# Patient Record
Sex: FEMALE
[De-identification: names, ages, dates, MRNs, and addresses within clinical notes are randomized per-mention and may not be internally consistent; named-entity substitution may affect disease eponyms.]

---

## 2021-04-06 PROBLEM — Z00.00 ENCOUNTER FOR PREVENTIVE HEALTH EXAMINATION: Status: ACTIVE | Noted: 2021-04-06

## 2021-04-06 RX ORDER — RIVASTIGMINE 4.6 MG/24H
4.6 PATCH, EXTENDED RELEASE TRANSDERMAL DAILY
Qty: 90 | Refills: 1 | Status: ACTIVE | COMMUNITY
Start: 2021-04-06 | End: 1900-01-01

## 2021-06-09 ENCOUNTER — APPOINTMENT (OUTPATIENT)
Dept: NEUROLOGY | Facility: CLINIC | Age: 86
End: 2021-06-09
Payer: MEDICARE

## 2021-06-09 VITALS
SYSTOLIC BLOOD PRESSURE: 127 MMHG | WEIGHT: 140 LBS | HEIGHT: 64 IN | BODY MASS INDEX: 23.9 KG/M2 | DIASTOLIC BLOOD PRESSURE: 84 MMHG | HEART RATE: 88 BPM

## 2021-06-09 DIAGNOSIS — I10 ESSENTIAL (PRIMARY) HYPERTENSION: ICD-10-CM

## 2021-06-09 DIAGNOSIS — Z82.49 FAMILY HISTORY OF ISCHEMIC HEART DISEASE AND OTHER DISEASES OF THE CIRCULATORY SYSTEM: ICD-10-CM

## 2021-06-09 DIAGNOSIS — Z80.1 FAMILY HISTORY OF MALIGNANT NEOPLASM OF TRACHEA, BRONCHUS AND LUNG: ICD-10-CM

## 2021-06-09 DIAGNOSIS — E03.9 HYPOTHYROIDISM, UNSPECIFIED: ICD-10-CM

## 2021-06-09 DIAGNOSIS — I48.0 PAROXYSMAL ATRIAL FIBRILLATION: ICD-10-CM

## 2021-06-09 DIAGNOSIS — M06.9 RHEUMATOID ARTHRITIS, UNSPECIFIED: ICD-10-CM

## 2021-06-09 PROCEDURE — 99213 OFFICE O/P EST LOW 20 MIN: CPT

## 2021-06-09 RX ORDER — LEVOTHYROXINE SODIUM 0.07 MG/1
75 TABLET ORAL
Refills: 0 | Status: ACTIVE | COMMUNITY

## 2021-06-09 RX ORDER — DIGOXIN 125 UG/1
125 TABLET ORAL
Refills: 0 | Status: ACTIVE | COMMUNITY

## 2021-06-09 RX ORDER — APIXABAN 2.5 MG/1
2.5 TABLET, FILM COATED ORAL
Refills: 0 | Status: ACTIVE | COMMUNITY

## 2021-06-09 RX ORDER — METOPROLOL SUCCINATE 100 MG/1
100 TABLET, EXTENDED RELEASE ORAL
Refills: 0 | Status: ACTIVE | COMMUNITY

## 2021-06-09 RX ORDER — ERGOCALCIFEROL 1.25 MG/1
1.25 MG CAPSULE ORAL
Refills: 0 | Status: ACTIVE | COMMUNITY

## 2021-06-09 RX ORDER — RIVASTIGMINE 9.5 MG/24H
9.5 PATCH, EXTENDED RELEASE TRANSDERMAL
Qty: 90 | Refills: 1 | Status: ACTIVE | COMMUNITY
Start: 2021-06-09 | End: 1900-01-01

## 2021-06-09 NOTE — ASSESSMENT
[FreeTextEntry1] : Mrs. Daigle is an 86-year-old with subacute dementia of the Alzheimer's type.  Her son wishes to increase her rivastigmine dose to 9.5 mg daily.  Alternate day dosing is only possible to lack of supervision.  She will need to be monitored for side effects.  He mentioned that she at times experiences melancholia.  If this persists, addition of a serotonin reuptake inhibitor can be considered.  I also discussed the new [employer] for Alzheimer's disease.  He would be interested if she is eligible.  She will return to the office in 4 months for routine follow-up.  Telephone contact will be maintained in the meantime.

## 2021-06-09 NOTE — REVIEW OF SYSTEMS
[Confused or Disoriented] : confusion [Memory Lapses or Loss] : memory loss [Decr. Concentrating Ability] : decreased concentrating ability [Difficulty Walking] : difficulty walking

## 2021-06-09 NOTE — HISTORY OF PRESENT ILLNESS
[FreeTextEntry1] : Mrs. Emi Daigle returned to the office having been last seen on September 1, 2020 by telemedicine. She is an 86-year-old right-handed patient with Amyvid PET scan proven Alzheimer’s dementia. She was intolerant to donepezil experiencing weight loss.  She has been treated with a rivastigmine patch 4.6 mg every other day without side effects.\par \par She was accompanied to the office by her son Parker.  She lives in her own home.  She is supervised quite frequently.  She is no longer able to self administer medications.  She has not wandered from her home.  She is able to dress, bathe and toilet herself.\par \par She was recently hospitalized at [facility] after suffering a fall.\par \par PAST MEDICAL/SURGICAL HISTORY: She suffers from hyperlipidemia, paroxysmal atrial fibrillation,\par rheumatoid arthritis and hypothyroidism. 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 There is no history of hypertension, diabetes,\par coronary, renal, hepatic, gastrointestinal or hematologic disease. Notable for bilateral oophorectomy.\par \par MEDICATIONS: Include rivastigmine patch 4.6 mg every other day, levothyroxine, Eliquis, digoxin, and Toprol.\par \par ALLERGIES: No Known Allergies\par \par FAMILY HISTORY: Notable for lung cancer, heart and thyroid disease.\par \par SOCIAL HISTORY: Alcohol - SOCIAL DRINKER; Occupation - RETIRED - .; Relationships -\par ; Smoking - FORMER SMOKER

## 2022-05-10 ENCOUNTER — APPOINTMENT (OUTPATIENT)
Dept: NEUROLOGY | Facility: CLINIC | Age: 87
End: 2022-05-10
Payer: MEDICARE

## 2022-05-10 VITALS
DIASTOLIC BLOOD PRESSURE: 79 MMHG | OXYGEN SATURATION: 98 % | SYSTOLIC BLOOD PRESSURE: 127 MMHG | BODY MASS INDEX: 23.05 KG/M2 | HEIGHT: 64 IN | WEIGHT: 135 LBS | HEART RATE: 89 BPM

## 2022-05-10 DIAGNOSIS — F02.80 ALZHEIMER'S DISEASE, UNSPECIFIED: ICD-10-CM

## 2022-05-10 DIAGNOSIS — G30.9 ALZHEIMER'S DISEASE, UNSPECIFIED: ICD-10-CM

## 2022-05-10 PROCEDURE — 99214 OFFICE O/P EST MOD 30 MIN: CPT

## 2022-05-10 NOTE — PHYSICAL EXAM
[FreeTextEntry1] : Constitutional:  Patient was elderly and frail but in no acute distress. \par \par Head:  Normocephalic, atraumatic. Tympanic membranes were examined. \par \par Neck:  Supple with full range of motion. \par \par Cardiovascular:  Cardiac rhythm was irregularly irregular without murmur. There were no carotid bruits. Peripheral pulses were full and symmetric. \par \par Respiratory:  Lungs were clear. \par \par Abdomen:  Soft and nontender. \par \par Spine:  Nontender. \par \par Skin:  There were no rashes. \par \par NEUROLOGICAL EXAMINATION:\par \par Mental Status: Patient was awake but extremely apathetic. Speech was fluent. There was no dysarthria.  She scored 12 out of 30 on MMSE, a five-point decline since her last visit.  She was severely disoriented, had no attention span or recall.\par \par Cranial Nerves: \par \par II: She could finger count bilaterally. Pupils were equal and reactive. Visual fields were full.  Fundi were not visualized.\par \par III, IV, VI:  Eye movements were full without nystagmus. \par \par V: Facial sensation was intact. \par \par VII: Facial strength was normal. \par \par VIII: Hearing was equal. \par \par IX, X: Palatal movement was normal. Phonation was normal. \par \par XI: Sternocleidomastoids and trapezii were normal. \par \par XII: Tongue was midline and movements normal. There was no lingual atrophy or fasciculations. \par \par Motor Examination: Muscle bulk, tone and strength were normal.  She was bradykinetic without tremor.\par \par Sensory Examination: Pinprick, vibration and joint position sense were intact. \par \par Reflexes: DTRs were 2+ throughout except the ankle jerks which were absent. \par \par Plantar Responses: Plantar responses were flexor. \par \par Coordination/Cerebellar Function: There was no dysmetria on finger to nose or heel to shin testing. \par \par Gait/Stance: Posture was stooped.Strides were short and turns decomposed.  Gait was relatively stable however.  She could not tandem.\par

## 2022-05-10 NOTE — CONSULT LETTER
[Dear  ___] : Dear  [unfilled], [Consult Letter:] : I had the pleasure of evaluating your patient, [unfilled]. [Please see my note below.] : Please see my note below. [Consult Closing:] : Thank you very much for allowing me to participate in the care of this patient.  If you have any questions, please do not hesitate to contact me. [Sincerely,] : Sincerely, [FreeTextEntry3] : Jerry Parekh MD\par  [DrAshia  ___] : Dr. BURK [DrAshia ___] : Dr. BURK

## 2022-05-10 NOTE — ASSESSMENT
[FreeTextEntry1] : Mrs. Daigle is an 87-year-old with subacute dementia of the Alzheimer's type.  Her neurologic condition has expectedly further deteriorated over the past year.  I have explained to Parker that she is unsafe to be left alone.  24/7 supervision is strongly suggested.  I had a long discussion regarding the use of sedatives and antipsychotics in the elderly with dementia and the risks.  24/7 supervision should hopefully alleviate the need for these medications.  I suggested close telephone and office follow-up.  She is scheduled to see Dr. Ariza next week for routine evaluation.

## 2022-05-10 NOTE — HISTORY OF PRESENT ILLNESS
[FreeTextEntry1] : Mrs. Emi Daigle returned to the office having been last seen on June 9, 2021.  She is an 87-year-old right-handed patient with Amyvid PET scan proven Alzheimer’s dementia. She was intolerant to donepezil experiencing weight loss.  She was subsequently treated with rivastigmine patch 4.6 mg every other day.  That medication was discontinued by her sons since last seen because of lack of benefit\par \par She was accompanied to the office by her son Parker.  Mrs. Daigle lives in her own home in [facility].  She now has an aide from 6 AM to 7 PM 7 days a week.  When the aide reports, she becomes upset because she is around.  She has been given a small dose of alprazolam at night.  The door is locked so she cannot escape from the house.  Over the past month, she has wandered from home twice during the day when her aide was present. She requires medication administration and assistance for all ADLs.  Her sons are considering moving her to a memory unit in [place].\par \par PAST MEDICAL/SURGICAL HISTORY: She suffers from hyperlipidemia, paroxysmal atrial fibrillation,\par rheumatoid arthritis and hypothyroidism. 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 There is no history of hypertension, diabetes,\par coronary, renal, hepatic, gastrointestinal or hematologic disease. Notable for bilateral oophorectomy.\par \par MEDICATIONS: Include possibly levothyroxine, Eliquis, digoxin, and Toprol.  She takes alprazolam as needed.\par \par ALLERGIES: No Known Allergies\par \par FAMILY HISTORY: Notable for lung cancer, heart and thyroid disease.\par \par SOCIAL HISTORY: Alcohol - SOCIAL DRINKER; Occupation - RETIRED - .; Relationships -\par ; Smoking - FORMER SMOKER

## 2022-05-13 ENCOUNTER — NON-APPOINTMENT (OUTPATIENT)
Age: 87
End: 2022-05-13

## 2022-05-19 ENCOUNTER — NON-APPOINTMENT (OUTPATIENT)
Age: 87
End: 2022-05-19

## 2022-10-04 ENCOUNTER — NON-APPOINTMENT (OUTPATIENT)
Age: 87
End: 2022-10-04

## 2022-12-08 ENCOUNTER — APPOINTMENT (OUTPATIENT)
Dept: NEUROLOGY | Facility: CLINIC | Age: 87
End: 2022-12-08

## 2023-03-20 NOTE — CONSULT LETTER
Received refill request for Lasix from [address] [place] [address].     Last ov:08/24/2022 [facility]    Last labs:12/30/2022 CMP    Last Refill:02/28/2022    Next appointment:On recall list for 08/24/2023 [facility] [Dear  ___] : Dear  [unfilled], [Consult Letter:] : I had the pleasure of evaluating your patient, [unfilled]. [Please see my note below.] : Please see my note below. [Consult Closing:] : Thank you very much for allowing me to participate in the care of this patient.  If you have any questions, please do not hesitate to contact me. [Sincerely,] : Sincerely, [FreeTextEntry3] : Jerry Parekh MD\par  [DrAshia  ___] : Dr. BURK [DrAshia ___] : Dr. BURK

## 2024-08-14 ENCOUNTER — TRANSCRIPTION ENCOUNTER (OUTPATIENT)
Age: 89
End: 2024-08-14

## 2024-08-16 ENCOUNTER — RESULT REVIEW (OUTPATIENT)
Age: 89
End: 2024-08-16

## 2024-08-17 ENCOUNTER — TRANSCRIPTION ENCOUNTER (OUTPATIENT)
Age: 89
End: 2024-08-17

## 2024-09-02 NOTE — PHYSICAL EXAM
NSCU ATTENDING -- ADDITIONAL PROGRESS NOTE    Nighttime rounds were performed -- please refer to earlier Progress Note for HPI details.    ICU Vital Signs Last 24 Hrs  T(C): 37.1 (02 Sep 2024 15:00), Max: 37.1 (02 Sep 2024 07:00)  T(F): 98.8 (02 Sep 2024 15:00), Max: 98.8 (02 Sep 2024 07:00)  HR: 133 (02 Sep 2024 17:29) (53 - 133)  BP: 88/51 (02 Sep 2024 16:00) (88/51 - 176/73)  BP(mean): 66 (02 Sep 2024 16:00) (66 - 109)  ABP: --  ABP(mean): --  RR: 12 (02 Sep 2024 16:00) (11 - 26)  SpO2: 100% (02 Sep 2024 17:29) (92% - 100%)    O2 Parameters below as of 02 Sep 2024 17:29  Patient On (Oxygen Delivery Method): face tent      CBC:            9.3    5.20  )-----------( 145      ( 09-01-24 @ 00:11 )             29.4         Chem:         ( 09-02-24 @ 04:12 )    138  |  111<H>  |  36<H>  ----------------------------<  150<H>  4.1   |  17<L>  |  1.82<H>        Liver Functions:     Type & Screen:         Relevant labwork and imaging reviewed.    MEDICATIONS  (STANDING):  albuterol/ipratropium for Nebulization 3 milliLiter(s) Nebulizer every 6 hours  atorvastatin 40 milliGRAM(s) Oral at bedtime  brivaracetam  IVPB 50 milliGRAM(s) IV Intermittent two times a day  cefepime   IVPB 1000 milliGRAM(s) IV Intermittent daily  chlorhexidine 4% Liquid 1 Application(s) Topical daily  dexMEDEtomidine Infusion 0.2 MICROgram(s)/kG/Hr (4.08 mL/Hr) IV Continuous <Continuous>  doxazosin 2 milliGRAM(s) Oral at bedtime  finasteride 5 milliGRAM(s) Oral daily  heparin   Injectable 5000 Unit(s) SubCutaneous every 8 hours  HYDROmorphone  Injectable 1 milliGRAM(s) IV Push once  insulin lispro (ADMELOG) corrective regimen sliding scale   SubCutaneous every 6 hours  insulin NPH human recombinant 8 Unit(s) SubCutaneous every 6 hours  latanoprost 0.005% Ophthalmic Solution 1 Drop(s) Both EYES at bedtime  LORazepam   Injectable 1 milliGRAM(s) IV Push once  mupirocin 2% Nasal 1 Application(s) Both Nostrils two times a day  norepinephrine Infusion 0.05 MICROgram(s)/kG/Min (7.65 mL/Hr) IV Continuous <Continuous>  polyethylene glycol 3350 17 Gram(s) Oral daily  QUEtiapine 50 milliGRAM(s) Oral every 8 hours  senna 2 Tablet(s) Oral at bedtime  sodium chloride 3%  Inhalation 4 milliLiter(s) Inhalation every 12 hours  tiotropium 2.5 MICROgram(s) Inhaler 2 Puff(s) Inhalation daily    MEDICATIONS  (PRN):  acetaminophen     Tablet .. 650 milliGRAM(s) Oral every 6 hours PRN Temp greater or equal to 38C (100.4F), Mild Pain (1 - 3)  oxyCODONE    IR 15 milliGRAM(s) Oral every 4 hours PRN Severe Pain (7 - 10)  oxyCODONE    IR 10 milliGRAM(s) Oral every 4 hours PRN Moderate Pain (4 - 6)        [A/P] POD 5 burrhole for SDH Evacuation, POD 3 MMAE  DNR/DNI  c/f aspiration pneumonia, on broad spectrum antibiotics, ID following   face tent 40%  seroquel 50mg Q8 increased overnight  GOC, palliative care consulted   SQH Q8 ppx start now   8/29 BLE dopplers negative     ATTENDING STATEMENT:    Patient is critically ill, requiring critical care services.     Attending: I have personally and independently provided 30 minutes of critical care services.  This excludes any time spent on separate procedures or teaching.   NSCU ATTENDING -- ADDITIONAL PROGRESS NOTE    Nighttime rounds were performed -- please refer to earlier Progress Note for HPI details.    ICU Vital Signs Last 24 Hrs  T(C): 37.1 (02 Sep 2024 15:00), Max: 37.1 (02 Sep 2024 07:00)  T(F): 98.8 (02 Sep 2024 15:00), Max: 98.8 (02 Sep 2024 07:00)  HR: 133 (02 Sep 2024 17:29) (53 - 133)  BP: 88/51 (02 Sep 2024 16:00) (88/51 - 176/73)  BP(mean): 66 (02 Sep 2024 16:00) (66 - 109)  ABP: --  ABP(mean): --  RR: 12 (02 Sep 2024 16:00) (11 - 26)  SpO2: 100% (02 Sep 2024 17:29) (92% - 100%)    O2 Parameters below as of 02 Sep 2024 17:29  Patient On (Oxygen Delivery Method): face tent      CBC:            9.3    5.20  )-----------( 145      ( 09-01-24 @ 00:11 )             29.4         Chem:         ( 09-02-24 @ 04:12 )    138  |  111<H>  |  36<H>  ----------------------------<  150<H>  4.1   |  17<L>  |  1.82<H>        Liver Functions:     Type & Screen:         Relevant labwork and imaging reviewed.    MEDICATIONS  (STANDING):  albuterol/ipratropium for Nebulization 3 milliLiter(s) Nebulizer every 6 hours  atorvastatin 40 milliGRAM(s) Oral at bedtime  brivaracetam  IVPB 50 milliGRAM(s) IV Intermittent two times a day  cefepime   IVPB 1000 milliGRAM(s) IV Intermittent daily  chlorhexidine 4% Liquid 1 Application(s) Topical daily  dexMEDEtomidine Infusion 0.2 MICROgram(s)/kG/Hr (4.08 mL/Hr) IV Continuous <Continuous>  doxazosin 2 milliGRAM(s) Oral at bedtime  finasteride 5 milliGRAM(s) Oral daily  heparin   Injectable 5000 Unit(s) SubCutaneous every 8 hours  HYDROmorphone  Injectable 1 milliGRAM(s) IV Push once  insulin lispro (ADMELOG) corrective regimen sliding scale   SubCutaneous every 6 hours  insulin NPH human recombinant 8 Unit(s) SubCutaneous every 6 hours  latanoprost 0.005% Ophthalmic Solution 1 Drop(s) Both EYES at bedtime  LORazepam   Injectable 1 milliGRAM(s) IV Push once  mupirocin 2% Nasal 1 Application(s) Both Nostrils two times a day  norepinephrine Infusion 0.05 MICROgram(s)/kG/Min (7.65 mL/Hr) IV Continuous <Continuous>  polyethylene glycol 3350 17 Gram(s) Oral daily  QUEtiapine 50 milliGRAM(s) Oral every 8 hours  senna 2 Tablet(s) Oral at bedtime  sodium chloride 3%  Inhalation 4 milliLiter(s) Inhalation every 12 hours  tiotropium 2.5 MICROgram(s) Inhaler 2 Puff(s) Inhalation daily    MEDICATIONS  (PRN):  acetaminophen     Tablet .. 650 milliGRAM(s) Oral every 6 hours PRN Temp greater or equal to 38C (100.4F), Mild Pain (1 - 3)  oxyCODONE    IR 15 milliGRAM(s) Oral every 4 hours PRN Severe Pain (7 - 10)  oxyCODONE    IR 10 milliGRAM(s) Oral every 4 hours PRN Moderate Pain (4 - 6)        [A/P] POD6 burrhole for SDH Evacuation, POD 3 MMAE  DNR/DNI  palliative care consulted, CMO  c/f aspiration pneumonia, on broad spectrum antibiotics  face tent 40%  seroquel 50mg Q8   oxy 15/20 prn for pain control  ativan prn  wean off precedex   as CMO-->d/c ERROL, NPH, d/c SQH   Q12 checks    [FreeTextEntry1] : Constitutional:  Patient was well-developed, well-nourished and in no acute distress. \par \par Head:  Normocephalic, atraumatic. Tympanic membranes were clear. \par \par Neck:  Supple with full range of motion. \par \par Cardiovascular:  Cardiac rhythm was irregularly irregular without murmur. There were no carotid bruits. Peripheral pulses were full and symmetric.  There was mild left greater than right pedal edema.\par \par Respiratory:  Lungs were clear. \par \par Abdomen:  Soft and nontender. \par \par Spine:  Nontender. \par \par Skin:  There were no rashes. \par \par NEUROLOGICAL EXAMINATION:\par \par Mental Status: Patient was awake but apathetic. Speech was fluent. There was no dysarthria.  She scored 17 out of 30 on MMSE.  She was severely disoriented.  She had no recall.  This was a two-point decline from last year.\par \par Cranial Nerves: \par \par II: She could finger count bilaterally. Pupils were equal and reactive. Visual fields were full. Funduscopic examination was normal. \par \par III, IV, VI:  Eye movements were full without nystagmus. \par \par V: Facial sensation was intact. \par \par VII: Facial strength was normal. \par \par VIII: Hearing was equal. \par \par IX, X: Palatal movement was normal. Phonation was normal. \par \par XI: Sternocleidomastoids and trapezii were normal. \par \par XII: Tongue was midline and movements normal. There was no lingual atrophy or fasciculations. \par \par Motor Examination: Muscle bulk, tone and strength were normal. \par \par Sensory Examination: Pinprick, vibration and joint position sense were intact. \par \par Reflexes: DTRs were 2+ throughout except the ankle jerks which were absent. \par \par Plantar Responses: Plantar responses were flexor. \par \par Coordination/Cerebellar Function: There was no dysmetria on finger to nose or heel to shin testing. \par \par Gait/Stance: Posture was stooped.  Strides were short and turns decomposed.  She could not tandem.\par

## 2024-10-05 ENCOUNTER — TRANSCRIPTION ENCOUNTER (OUTPATIENT)
Age: 89
End: 2024-10-05